# Patient Record
Sex: FEMALE | Race: WHITE | URBAN - NONMETROPOLITAN AREA
[De-identification: names, ages, dates, MRNs, and addresses within clinical notes are randomized per-mention and may not be internally consistent; named-entity substitution may affect disease eponyms.]

---

## 2018-01-29 ENCOUNTER — HISTORICAL (OUTPATIENT)
Dept: ADMINISTRATIVE | Facility: HOSPITAL | Age: 73
End: 2018-01-29

## 2018-09-04 ENCOUNTER — HISTORICAL (OUTPATIENT)
Dept: ADMINISTRATIVE | Facility: HOSPITAL | Age: 73
End: 2018-09-04

## 2019-03-06 ENCOUNTER — HISTORICAL (OUTPATIENT)
Dept: ADMINISTRATIVE | Facility: HOSPITAL | Age: 74
End: 2019-03-06

## 2019-06-21 ENCOUNTER — HISTORICAL (OUTPATIENT)
Dept: ADMINISTRATIVE | Facility: HOSPITAL | Age: 74
End: 2019-06-21

## 2019-06-24 ENCOUNTER — HISTORICAL (OUTPATIENT)
Dept: ADMINISTRATIVE | Facility: HOSPITAL | Age: 74
End: 2019-06-24

## 2019-08-12 ENCOUNTER — HISTORICAL (OUTPATIENT)
Dept: ADMINISTRATIVE | Facility: HOSPITAL | Age: 74
End: 2019-08-12

## 2019-11-01 ENCOUNTER — HISTORICAL (OUTPATIENT)
Dept: ADMINISTRATIVE | Facility: HOSPITAL | Age: 74
End: 2019-11-01

## 2019-11-08 ENCOUNTER — HISTORICAL (OUTPATIENT)
Dept: ADMINISTRATIVE | Facility: HOSPITAL | Age: 74
End: 2019-11-08

## 2020-01-08 ENCOUNTER — HISTORICAL (OUTPATIENT)
Dept: ADMINISTRATIVE | Facility: HOSPITAL | Age: 75
End: 2020-01-08

## 2020-03-18 ENCOUNTER — HISTORICAL (OUTPATIENT)
Dept: ADMINISTRATIVE | Facility: HOSPITAL | Age: 75
End: 2020-03-18

## 2021-03-18 ENCOUNTER — HISTORICAL (OUTPATIENT)
Dept: ADMINISTRATIVE | Facility: HOSPITAL | Age: 76
End: 2021-03-18

## 2021-08-27 ENCOUNTER — HISTORICAL (OUTPATIENT)
Dept: ADMINISTRATIVE | Facility: HOSPITAL | Age: 76
End: 2021-08-27

## 2021-10-07 ENCOUNTER — HISTORICAL (OUTPATIENT)
Dept: SURGERY | Facility: HOSPITAL | Age: 76
End: 2021-10-07

## 2021-11-11 ENCOUNTER — HISTORICAL (OUTPATIENT)
Dept: ADMINISTRATIVE | Facility: HOSPITAL | Age: 76
End: 2021-11-11

## 2021-11-12 ENCOUNTER — HISTORICAL (OUTPATIENT)
Dept: ADMINISTRATIVE | Facility: HOSPITAL | Age: 76
End: 2021-11-12

## 2022-04-07 ENCOUNTER — HISTORICAL (OUTPATIENT)
Dept: ADMINISTRATIVE | Facility: HOSPITAL | Age: 77
End: 2022-04-07

## 2022-04-23 VITALS — BODY MASS INDEX: 27.99 KG/M2 | HEIGHT: 62 IN | WEIGHT: 152.13 LBS

## 2022-05-02 NOTE — HISTORICAL [facility] CERNER
This is a historical note converted from [employer]. Formatting and pictures may have been removed.  Please reference [employer] for original formatting and attached multimedia. Indication for Surgery  76-year-old female?that developed?a devastating infection on left upper extremity.??She has severe osteomyelitis?of all of her metacarpal bones as well as her?wrist bones?in her distal radius and ulna.??She is status post incision and drainage?by Dr. Adkins.??The patient has been improving the postoperative period?however it developed skin necrosis on the dorsum of her left hand.??Options were discussed with her and she elected to proceed with debridement of this necrotic skin.??Informed consent was obtained.  Preoperative Diagnosis  Necrotic skin?of the dorsal left hand  Postoperative Diagnosis  Same  Operation  Debridement of necrotic skin and subcutaneous tissue?of the left dorsal hand?measuring approximately 6 x 6 cm  Surgeon(s)  Angus Santiago  Assistant  None  Anesthesia  LMA  Estimated Blood Loss  Minimal  Findings  Necrotic tissue debrided back to healthy appearing?tissue in all areas  Specimen(s)  None  Complications  None  Technique  Patient was identified in the preoperative holding area.??Informed consent reviewed.??Patient was taken the operating room placed supine position?and LMA anesthesia was provided.??She was prepped and draped in sterile surgical fashion.??Timeout was taken.??Started the procedure by?carefully using an iris scissor?to remove the necrotic skin and subcutaneous tissue from the dorsal left hand this was approximately 6 x 6 cm.??This was trimmed back to healthy bleeding tissue in all areas.??After this was accomplished I then?pulse irrigated the wound with saline.??Sterile dressings were then applied.??There were no complications.??Hemostasis was insured with electrocautery.